# Patient Record
Sex: FEMALE | Race: BLACK OR AFRICAN AMERICAN | NOT HISPANIC OR LATINO | URBAN - METROPOLITAN AREA
[De-identification: names, ages, dates, MRNs, and addresses within clinical notes are randomized per-mention and may not be internally consistent; named-entity substitution may affect disease eponyms.]

---

## 2022-07-09 ENCOUNTER — TELEPHONE ENCOUNTER (OUTPATIENT)
Dept: URBAN - METROPOLITAN AREA CLINIC 121 | Facility: CLINIC | Age: 72
End: 2022-07-09

## 2022-07-09 RX ORDER — LOVASTATIN 10 MG/1
TABLET ORAL ONCE A DAY
Refills: 0 | OUTPATIENT
Start: 2017-02-27 | End: 2019-08-02

## 2022-07-09 RX ORDER — SITAGLIPTIN PHOSPHATE 100 MG
TABLET ORAL ONCE A DAY
Refills: 0 | OUTPATIENT
Start: 2017-02-27 | End: 2020-01-03

## 2022-07-09 RX ORDER — TELMISARTAN 20 MG/1
TABLET ORAL ONCE A DAY
Refills: 0 | OUTPATIENT
Start: 2019-06-26 | End: 2019-08-29

## 2022-07-09 RX ORDER — GLIMEPIRIDE 2 MG/1
TABLET ORAL TWICE A DAY
Refills: 0 | OUTPATIENT
Start: 2017-02-27 | End: 2019-08-02

## 2022-07-09 RX ORDER — LOSARTAN POTASSIUM 25 MG/1
TABLET, FILM COATED ORAL ONCE A DAY
Refills: 0 | OUTPATIENT
Start: 2017-02-27 | End: 2019-08-02

## 2022-07-09 RX ORDER — AMLODIPINE BESYLATE 10 MG/1
TABLET ORAL ONCE A DAY
Refills: 0 | OUTPATIENT
Start: 2017-02-27 | End: 2019-08-02

## 2022-07-10 ENCOUNTER — TELEPHONE ENCOUNTER (OUTPATIENT)
Dept: URBAN - METROPOLITAN AREA CLINIC 121 | Facility: CLINIC | Age: 72
End: 2022-07-10

## 2022-07-10 RX ORDER — SITAGLIPTIN PHOSPHATE 100 MG
TABLET ORAL ONCE A DAY
Refills: 0 | Status: ACTIVE | COMMUNITY
Start: 2020-01-03

## 2023-09-15 ENCOUNTER — WEB ENCOUNTER (OUTPATIENT)
Dept: URBAN - METROPOLITAN AREA CLINIC 60 | Facility: CLINIC | Age: 73
End: 2023-09-15

## 2023-09-19 ENCOUNTER — OFFICE VISIT (OUTPATIENT)
Dept: URBAN - METROPOLITAN AREA CLINIC 60 | Facility: CLINIC | Age: 73
End: 2023-09-19
Payer: MEDICARE

## 2023-09-19 ENCOUNTER — TELEPHONE ENCOUNTER (OUTPATIENT)
Dept: URBAN - METROPOLITAN AREA CLINIC 63 | Facility: CLINIC | Age: 73
End: 2023-09-19

## 2023-09-19 ENCOUNTER — LAB OUTSIDE AN ENCOUNTER (OUTPATIENT)
Dept: URBAN - METROPOLITAN AREA CLINIC 60 | Facility: CLINIC | Age: 73
End: 2023-09-19

## 2023-09-19 VITALS
HEIGHT: 64 IN | OXYGEN SATURATION: 98 % | TEMPERATURE: 97.8 F | HEART RATE: 74 BPM | WEIGHT: 127 LBS | DIASTOLIC BLOOD PRESSURE: 80 MMHG | BODY MASS INDEX: 21.68 KG/M2 | SYSTOLIC BLOOD PRESSURE: 138 MMHG

## 2023-09-19 DIAGNOSIS — D69.6 LOW PLATELET COUNT: ICD-10-CM

## 2023-09-19 DIAGNOSIS — K21.9 GASTROESOPHAGEAL REFLUX DISEASE WITHOUT ESOPHAGITIS: ICD-10-CM

## 2023-09-19 PROBLEM — 415116008: Status: ACTIVE | Noted: 2023-09-19

## 2023-09-19 PROBLEM — 266435005: Status: ACTIVE | Noted: 2023-09-19

## 2023-09-19 PROCEDURE — 99212 OFFICE O/P EST SF 10 MIN: CPT | Performed by: PHYSICIAN ASSISTANT

## 2023-09-19 RX ORDER — CALCIUM CARBONATE/VITAMIN D3 500 MG-2.5
1 TABLET WITH A MEAL TABLET,CHEWABLE ORAL ONCE A DAY
Status: ACTIVE | COMMUNITY

## 2023-09-19 RX ORDER — EMPAGLIFLOZIN 25 MG/1
1 TABLET TABLET, FILM COATED ORAL ONCE A DAY
Status: ACTIVE | COMMUNITY

## 2023-09-19 RX ORDER — PRAVASTATIN SODIUM 10 MG/1
2 TABLETS TABLET ORAL ONCE A DAY
Status: ACTIVE | COMMUNITY

## 2023-09-19 RX ORDER — SITAGLIPTIN PHOSPHATE 100 MG
TABLET ORAL ONCE A DAY
Refills: 0 | Status: ACTIVE | COMMUNITY
Start: 2020-01-03

## 2023-09-19 RX ORDER — OMEPRAZOLE 20 MG/1
1 CAPSULE 30 MINUTES BEFORE MORNING MEAL CAPSULE, DELAYED RELEASE ORAL ONCE A DAY
Qty: 90 CAPSULE | Refills: 0 | OUTPATIENT
Start: 2023-09-19

## 2023-09-19 RX ORDER — AMLODIPINE BESYLATE AND BENAZEPRIL HYDROCHLORIDE 5; 20 MG/1; MG/1
AS DIRECTED CAPSULE ORAL
Status: ACTIVE | COMMUNITY

## 2023-09-19 NOTE — HPI-TODAY'S VISIT:
Geovanna is a 73 year old female with complaint of heartburn. She has been having heartburn symptoms for a long time, usually when she eats greasy food, such as fried chicken, fried fish. Corn meal and sweet potato will also cause heartburn symptoms. Symptoms are worse at night and she will get a cough at bedtime. Symptoms are worse with milk. Has acid reflux,  not everyday. Feels a "Fullness in the stomach." Epigastric bloating. Will see blood when she wipes sometimes. Feels pain in the left upper quadrant, once a week, depends on what she eats. She has tried taking prilosec for 2 weeks at a time for 2 different episodes this year and it didn't help.  She is taking tums during episodes. A month ago, she needed to take Tums everyday for a week.  Denies any diarrhea or constipation, hematochezia, or melena.  Denies any NSAIDS use. Denies any carbonated beverages. Seldom eats spicy foods. Does not eat citrus fruit or juice. Does not regularly eat tomato sauces.  Has history of fluctuating low platelet count, last count in May showed low platelets, at 115. Review of [facility] records shows platelets in 2019 at 88, and in 2018 at 102.  She has seen Dr. Herrera for this in the past.

## 2023-10-05 ENCOUNTER — LAB OUTSIDE AN ENCOUNTER (OUTPATIENT)
Dept: URBAN - METROPOLITAN AREA CLINIC 63 | Facility: CLINIC | Age: 73
End: 2023-10-05

## 2023-10-07 LAB
HEMATOCRIT: 39.1
HEMATOLOGY COMMENTS:: (no result)
HEMOGLOBIN: 13.5
MCH: 30.8
MCHC: 34.5
MCV: 89
NRBC: (no result)
PLATELETS: 115
RBC: 4.39
RDW: 12.6
WBC: 6.8

## 2023-10-11 ENCOUNTER — TELEPHONE ENCOUNTER (OUTPATIENT)
Dept: URBAN - METROPOLITAN AREA CLINIC 64 | Facility: CLINIC | Age: 73
End: 2023-10-11

## 2023-10-16 ENCOUNTER — OUT OF OFFICE VISIT (OUTPATIENT)
Dept: URBAN - METROPOLITAN AREA SURGERY CENTER 4 | Facility: SURGERY CENTER | Age: 73
End: 2023-10-16
Payer: MEDICARE

## 2023-10-16 ENCOUNTER — CLAIMS CREATED FROM THE CLAIM WINDOW (OUTPATIENT)
Dept: URBAN - METROPOLITAN AREA CLINIC 4 | Facility: CLINIC | Age: 73
End: 2023-10-16
Payer: MEDICARE

## 2023-10-16 DIAGNOSIS — K22.2 SCHATZKI'S RING: ICD-10-CM

## 2023-10-16 DIAGNOSIS — K29.70 GASTRITIS WITHOUT BLEEDING, UNSPECIFIED CHRONICITY, UNSPECIFIED GASTRITIS TYPE: ICD-10-CM

## 2023-10-16 DIAGNOSIS — T47.8X5A ADVERSE EFFECT OF OTHER AGENTS PRIMARILY AFFECTING GASTROINTESTINAL SYSTEM, INITIAL ENCOUNTER: ICD-10-CM

## 2023-10-16 DIAGNOSIS — Z12.11 COLON CANCER SCREENING (HIGH RISK): ICD-10-CM

## 2023-10-16 DIAGNOSIS — K44.9 DIAPHRAGMATIC HERNIA WITHOUT OBSTRUCTION OR GANGRENE: ICD-10-CM

## 2023-10-16 DIAGNOSIS — R10.13 EPIGASTRIC ABDOMINAL PAIN: ICD-10-CM

## 2023-10-16 DIAGNOSIS — K22.2 ESOPHAGEAL OBSTRUCTION: ICD-10-CM

## 2023-10-16 DIAGNOSIS — K44.9 HIATAL HERNIA: ICD-10-CM

## 2023-10-16 PROCEDURE — 43239 EGD BIOPSY SINGLE/MULTIPLE: CPT | Performed by: INTERNAL MEDICINE

## 2023-10-16 PROCEDURE — 88305 TISSUE EXAM BY PATHOLOGIST: CPT | Performed by: PATHOLOGY

## 2023-10-16 PROCEDURE — 00731 ANES UPR GI NDSC PX NOS: CPT | Performed by: NURSE ANESTHETIST, CERTIFIED REGISTERED

## 2023-10-16 PROCEDURE — 43239 EGD BIOPSY SINGLE/MULTIPLE: CPT | Performed by: CLINIC/CENTER

## 2023-10-16 PROCEDURE — 88312 SPECIAL STAINS GROUP 1: CPT | Performed by: PATHOLOGY

## 2023-10-16 RX ORDER — CALCIUM CARBONATE/VITAMIN D3 500 MG-2.5
1 TABLET WITH A MEAL TABLET,CHEWABLE ORAL ONCE A DAY
Status: ACTIVE | COMMUNITY

## 2023-10-16 RX ORDER — EMPAGLIFLOZIN 25 MG/1
1 TABLET TABLET, FILM COATED ORAL ONCE A DAY
Status: ACTIVE | COMMUNITY

## 2023-10-16 RX ORDER — AMLODIPINE BESYLATE AND BENAZEPRIL HYDROCHLORIDE 5; 20 MG/1; MG/1
AS DIRECTED CAPSULE ORAL
Status: ACTIVE | COMMUNITY

## 2023-10-16 RX ORDER — PRAVASTATIN SODIUM 10 MG/1
2 TABLETS TABLET ORAL ONCE A DAY
Status: ACTIVE | COMMUNITY

## 2023-10-16 RX ORDER — OMEPRAZOLE 20 MG/1
1 CAPSULE 30 MINUTES BEFORE MORNING MEAL CAPSULE, DELAYED RELEASE ORAL ONCE A DAY
Qty: 90 CAPSULE | Refills: 0 | Status: ACTIVE | COMMUNITY
Start: 2023-09-19

## 2023-10-16 RX ORDER — SITAGLIPTIN PHOSPHATE 100 MG
TABLET ORAL ONCE A DAY
Refills: 0 | Status: ACTIVE | COMMUNITY
Start: 2020-01-03

## 2023-10-26 ENCOUNTER — DASHBOARD ENCOUNTERS (OUTPATIENT)
Age: 73
End: 2023-10-26

## 2023-10-26 ENCOUNTER — OFFICE VISIT (OUTPATIENT)
Dept: URBAN - METROPOLITAN AREA CLINIC 60 | Facility: CLINIC | Age: 73
End: 2023-10-26
Payer: MEDICARE

## 2023-10-26 VITALS
TEMPERATURE: 97.8 F | SYSTOLIC BLOOD PRESSURE: 122 MMHG | HEART RATE: 68 BPM | DIASTOLIC BLOOD PRESSURE: 64 MMHG | OXYGEN SATURATION: 98 % | WEIGHT: 123.2 LBS | BODY MASS INDEX: 21.03 KG/M2 | RESPIRATION RATE: 12 BRPM | HEIGHT: 64 IN

## 2023-10-26 DIAGNOSIS — R63.4 WEIGHT LOSS: ICD-10-CM

## 2023-10-26 DIAGNOSIS — K21.9 GASTROESOPHAGEAL REFLUX DISEASE, UNSPECIFIED WHETHER ESOPHAGITIS PRESENT: ICD-10-CM

## 2023-10-26 DIAGNOSIS — E11.9 TYPE 2 DIABETES MELLITUS WITHOUT COMPLICATION, UNSPECIFIED WHETHER LONG TERM INSULIN USE: ICD-10-CM

## 2023-10-26 PROBLEM — 313436004: Status: ACTIVE | Noted: 2023-10-26

## 2023-10-26 PROCEDURE — 99214 OFFICE O/P EST MOD 30 MIN: CPT

## 2023-10-26 RX ORDER — EMPAGLIFLOZIN 25 MG/1
TAKE 1 TABLET BY MOUTH ONCE DAILY TABLET, FILM COATED ORAL
Qty: 30 EACH | Refills: 1 | Status: ACTIVE | COMMUNITY

## 2023-10-26 RX ORDER — SITAGLIPTIN 50 MG/1
TABLET, FILM COATED ORAL
Qty: 90 TABLET | Status: ACTIVE | COMMUNITY

## 2023-10-26 RX ORDER — OMEPRAZOLE 20 MG/1
1 CAPSULE 30 MINUTES BEFORE MORNING MEAL CAPSULE, DELAYED RELEASE ORAL ONCE A DAY
Qty: 90 CAPSULE | Refills: 0 | Status: ACTIVE | COMMUNITY
Start: 2023-09-19

## 2023-10-26 RX ORDER — AMLODIPINE BESYLATE AND BENAZEPRIL HYDROCHLORIDE 5; 20 MG/1; MG/1
TAKE 1 CAPSULE BY MOUTH ONCE DAILY CAPSULE ORAL
Qty: 90 EACH | Refills: 1 | Status: ACTIVE | COMMUNITY

## 2023-10-26 RX ORDER — CALCIUM CARBONATE/VITAMIN D3 500 MG-2.5
1 TABLET WITH A MEAL TABLET,CHEWABLE ORAL ONCE A DAY
Status: ACTIVE | COMMUNITY

## 2023-10-26 RX ORDER — PRAVASTATIN SODIUM 10 MG/1
TABLET ORAL
Qty: 90 TABLET | Status: ACTIVE | COMMUNITY

## 2023-10-26 RX ORDER — METFORMIN ER 500 MG 500 MG/1
TAKE 1 TABLET BY MOUTH ONCE DAILY TABLET ORAL
Qty: 90 EACH | Refills: 0 | Status: ACTIVE | COMMUNITY

## 2023-10-26 RX ORDER — AMLODIPINE BESYLATE AND BENAZEPRIL HYDROCHLORIDE 5; 20 MG/1; MG/1
AS DIRECTED CAPSULE ORAL
Status: ACTIVE | COMMUNITY

## 2023-10-26 RX ORDER — ALENDRONATE SODIUM 70 MG/1
TAKE 1 TABLET BY MOUTH ONCE A WEEK TABLET ORAL
Qty: 12 EACH | Refills: 0 | Status: ACTIVE | COMMUNITY

## 2023-10-26 NOTE — HPI-TODAY'S VISIT:
Geovanna is a 73-year-old female presenting today after having an EGD completed on 10/16/2023 to evaluate GERD. Upper GI endoscopy results as followed: Normal esophagus, 3 cm hiatal hernia, chronic gastritis that was biopsied, normal duodenal bulb and second portion of the duodenum, nonobstructing Schatzki ring that was biopsied. Pathology returns as the following: Stomach (antrum and body): Proton pump inhibitor effect, no evidence of H. pylori or intestinal metaplasia, negative for dysplasia/malignancy. Esophagus (lower third): Squamous mucosa with reflux type changes, no columnar mucosa identified, no evidence of Amato's esophagus or eosinophilic esophagitis, negative for infectious organisms, dysplasia or malignancy. Since starting omeprazole her GERD symptoms have completely resolved. She recently stopped omeprazole and still has not been having symptoms. She has lost 4 pounds in the last month and a half. She states that she has been eating less due to her diabetes and believes she may be in a caloric deficit. She recently started and stopped Januvia and also recently started Jardiance. However, these medications were too expensive so she will be stopping both of them and staying on metformin extended release. She rates her current stress level as a 2 out of 10. She denies abdominal masses, fatigue, abdominal pain, chest pain, nausea/vomiting, hematemesis, constipation/diarrhea/changes in bowel habits, hematochezia, melena. Her last colonoscopy was done in 2017 and she was given a 10-year recall and will be due in 2027.

## 2023-10-26 NOTE — HPI-PREVIOUS PROCEDURES
EGD 10/16/2023: Normal esophagus, 3 cm hiatal hernia, chronic gastritis that was biopsied, normal duodenal bulb and second portion of the duodenum, nonobstructing Schatzki ring that was biopsied. Pathology returns as the following: Stomach (antrum and body): Proton pump inhibitor effect, no evidence of H. pylori or intestinal metaplasia, negative for dysplasia/malignancy. Esophagus (lower third): Squamous mucosa with reflux type changes, no columnar mucosa identified, no evidence of Amato's esophagus or eosinophilic esophagitis, negative for infectious organisms, dysplasia or malignancy.

## 2023-10-30 ENCOUNTER — OFFICE VISIT (OUTPATIENT)
Dept: URBAN - METROPOLITAN AREA CLINIC 60 | Facility: CLINIC | Age: 73
End: 2023-10-30